# Patient Record
Sex: MALE | Race: BLACK OR AFRICAN AMERICAN | Employment: UNEMPLOYED | URBAN - NONMETROPOLITAN AREA
[De-identification: names, ages, dates, MRNs, and addresses within clinical notes are randomized per-mention and may not be internally consistent; named-entity substitution may affect disease eponyms.]

---

## 2020-01-20 ENCOUNTER — ANESTHESIA EVENT (OUTPATIENT)
Dept: OPERATING ROOM | Age: 45
End: 2020-01-20

## 2020-01-20 ENCOUNTER — ANESTHESIA (OUTPATIENT)
Dept: OPERATING ROOM | Age: 45
End: 2020-01-20

## 2020-01-20 ENCOUNTER — HOSPITAL ENCOUNTER (EMERGENCY)
Age: 45
End: 2020-01-20
Attending: EMERGENCY MEDICINE

## 2020-01-20 VITALS
SYSTOLIC BLOOD PRESSURE: 151 MMHG | DIASTOLIC BLOOD PRESSURE: 134 MMHG | TEMPERATURE: 93.1 F | WEIGHT: 180 LBS | OXYGEN SATURATION: 83 % | HEART RATE: 92 BPM

## 2020-01-20 LAB
ABO CHECK: NORMAL
ALBUMIN SERPL-MCNC: 1.7 G/DL (ref 3.5–5.1)
ALLEN TEST: ABNORMAL
ALLEN TEST: ABNORMAL
ALP BLD-CCNC: 29 U/L (ref 38–126)
ALT SERPL-CCNC: 79 U/L (ref 11–66)
ANGLE TEG: 32.4 DEG (ref 53–72)
ANION GAP SERPL CALCULATED.3IONS-SCNC: 29 MEQ/L (ref 8–16)
ANISOCYTOSIS: PRESENT
ANTIBODY SCREEN: NORMAL
APTT: 82.5 SECONDS (ref 22–38)
AST SERPL-CCNC: 88 U/L (ref 5–40)
BASE EXCESS (CALCULATED): -22.3 MMOL/L (ref -2.5–2.5)
BASE EXCESS (CALCULATED): -6.8 MMOL/L (ref -2.5–2.5)
BASOPHILS # BLD: 0.3 %
BASOPHILS ABSOLUTE: 0 THOU/MM3 (ref 0–0.1)
BILIRUB SERPL-MCNC: < 0.2 MG/DL (ref 0.3–1.2)
BILIRUBIN DIRECT: < 0.2 MG/DL (ref 0–0.3)
BUN BLDV-MCNC: 7 MG/DL (ref 7–22)
CALCIUM SERPL-MCNC: 13.9 MG/DL (ref 8.5–10.5)
CHLORIDE BLD-SCNC: 110 MEQ/L (ref 98–111)
CO2: 13 MEQ/L (ref 23–33)
COLLECTED BY:: ABNORMAL
COLLECTED BY:: ABNORMAL
CREAT SERPL-MCNC: 1 MG/DL (ref 0.4–1.2)
CRENATED RBC'S: ABNORMAL
DEVICE: ABNORMAL
DEVICE: ABNORMAL
EOSINOPHIL # BLD: 0.7 %
EOSINOPHILS ABSOLUTE: 0 THOU/MM3 (ref 0–0.4)
EPL-TEG: 94.4 %
ERYTHROCYTE [DISTWIDTH] IN BLOOD BY AUTOMATED COUNT: 14 % (ref 11.5–14.5)
ERYTHROCYTE [DISTWIDTH] IN BLOOD BY AUTOMATED COUNT: 52.2 FL (ref 35–45)
ETHYL ALCOHOL, SERUM: 0.12 %
GFR SERPL CREATININE-BSD FRML MDRD: > 90 ML/MIN/1.73M2
GLUCOSE BLD-MCNC: 326 MG/DL (ref 70–108)
HCO3: 11 MMOL/L (ref 23–28)
HCO3: 24 MMOL/L (ref 23–28)
HCT VFR BLD CALC: 27 % (ref 42–52)
HEMOGLOBIN: 7.8 GM/DL (ref 14–18)
HEPARIN THERAPY: ABNORMAL
HYPOCHROMIA: PRESENT
IFIO2: 100
IFIO2: 100
IMMATURE GRANS (ABS): 0.16 THOU/MM3 (ref 0–0.07)
IMMATURE GRANULOCYTES: 5.3 %
INHIBITION AA TEG: ABNORMAL %
INHIBITION ADP TEG: ABNORMAL %
INR BLD: 1.82 (ref 0.85–1.13)
KINETICS TEG: ABNORMAL MINUTES (ref 1–3)
LACTIC ACID: 19.5 MMOL/L (ref 0.5–2.2)
LIPASE: 13.8 U/L (ref 5.6–51.3)
LY30 (LYSIS) TEG: 94.4 % (ref 0–7.5)
LYMPHOCYTES # BLD: 70 %
LYMPHOCYTES ABSOLUTE: 2.1 THOU/MM3 (ref 1–4.8)
MA (MAX CLOT) TEG: 8.8 MM (ref 50–70)
MA(AA) TEG: 3.1 MM
MA(ACTIVATED) TEG: ABNORMAL MM
MA(ADP) TEG: 2.7 MM
MCH RBC QN AUTO: 29.3 PG (ref 26–33)
MCHC RBC AUTO-ENTMCNC: 28.9 GM/DL (ref 32.2–35.5)
MCV RBC AUTO: 101.5 FL (ref 80–94)
MONOCYTES # BLD: 4 %
MONOCYTES ABSOLUTE: 0.1 THOU/MM3 (ref 0.4–1.3)
NUCLEATED RED BLOOD CELLS: 1 /100 WBC
O2 SATURATION: 49 %
O2 SATURATION: 82 %
OSMOLALITY CALCULATION: 312.3 MOSMOL/KG (ref 275–300)
PCO2: 72 MMHG (ref 35–45)
PCO2: 95 MMHG (ref 35–45)
PH BLOOD GAS: 6.77 (ref 7.35–7.45)
PH BLOOD GAS: 7.01 (ref 7.35–7.45)
PLATELET # BLD: 34 THOU/MM3 (ref 130–400)
PLATELET ESTIMATE: ABNORMAL
PMV BLD AUTO: 10.7 FL (ref 9.4–12.4)
PO2: 51 MMHG (ref 71–104)
PO2: 71 MMHG (ref 71–104)
POIKILOCYTES: ABNORMAL
POTASSIUM SERPL-SCNC: 4.5 MEQ/L (ref 3.5–5.2)
PRO-BNP: 23.7 PG/ML (ref 0–450)
RBC # BLD: 2.66 MILL/MM3 (ref 4.7–6.1)
REACTION TIME TEG: 4.4 MINUTES (ref 5–10)
RH FACTOR: NORMAL
SCAN OF BLOOD SMEAR: NORMAL
SEG NEUTROPHILS: 19.7 %
SEGMENTED NEUTROPHILS ABSOLUTE COUNT: 0.6 THOU/MM3 (ref 1.8–7.7)
SODIUM BLD-SCNC: 152 MEQ/L (ref 135–145)
SOURCE, BLOOD GAS: ABNORMAL
SOURCE, BLOOD GAS: ABNORMAL
TOTAL PROTEIN: 2.9 G/DL (ref 6.1–8)
TROPONIN T: 0.21 NG/ML
WBC # BLD: 3 THOU/MM3 (ref 4.8–10.8)

## 2020-01-20 PROCEDURE — 2580000003 HC RX 258: Performed by: EMERGENCY MEDICINE

## 2020-01-20 PROCEDURE — 96374 THER/PROPH/DIAG INJ IV PUSH: CPT

## 2020-01-20 PROCEDURE — 6360000002 HC RX W HCPCS

## 2020-01-20 PROCEDURE — P9035 PLATELET PHERES LEUKOREDUCED: HCPCS

## 2020-01-20 PROCEDURE — 84484 ASSAY OF TROPONIN QUANT: CPT

## 2020-01-20 PROCEDURE — 2500000003 HC RX 250 WO HCPCS: Performed by: EMERGENCY MEDICINE

## 2020-01-20 PROCEDURE — 2500000003 HC RX 250 WO HCPCS

## 2020-01-20 PROCEDURE — 37799 UNLISTED PX VASCULAR SURGERY: CPT

## 2020-01-20 PROCEDURE — 83605 ASSAY OF LACTIC ACID: CPT

## 2020-01-20 PROCEDURE — 36430 TRANSFUSION BLD/BLD COMPNT: CPT

## 2020-01-20 PROCEDURE — 86901 BLOOD TYPING SEROLOGIC RH(D): CPT

## 2020-01-20 PROCEDURE — 85576 BLOOD PLATELET AGGREGATION: CPT

## 2020-01-20 PROCEDURE — 92950 HEART/LUNG RESUSCITATION CPR: CPT

## 2020-01-20 PROCEDURE — P9016 RBC LEUKOCYTES REDUCED: HCPCS

## 2020-01-20 PROCEDURE — 83690 ASSAY OF LIPASE: CPT

## 2020-01-20 PROCEDURE — 6360000002 HC RX W HCPCS: Performed by: EMERGENCY MEDICINE

## 2020-01-20 PROCEDURE — 83880 ASSAY OF NATRIURETIC PEPTIDE: CPT

## 2020-01-20 PROCEDURE — 36415 COLL VENOUS BLD VENIPUNCTURE: CPT

## 2020-01-20 PROCEDURE — 32551 INSERTION OF CHEST TUBE: CPT

## 2020-01-20 PROCEDURE — 32160 OPEN CHEST HEART MASSAGE: CPT

## 2020-01-20 PROCEDURE — 2709999900 HC NON-CHARGEABLE SUPPLY

## 2020-01-20 PROCEDURE — 3600000004 HC SURGERY LEVEL 4 BASE: Performed by: SURGERY

## 2020-01-20 PROCEDURE — 86900 BLOOD TYPING SEROLOGIC ABO: CPT

## 2020-01-20 PROCEDURE — C1729 CATH, DRAINAGE: HCPCS

## 2020-01-20 PROCEDURE — 31500 INSERT EMERGENCY AIRWAY: CPT

## 2020-01-20 PROCEDURE — 99285 EMERGENCY DEPT VISIT HI MDM: CPT

## 2020-01-20 PROCEDURE — 6820000003 HC L2 TRAUMA ALERT ACTIVATION

## 2020-01-20 PROCEDURE — 96375 TX/PRO/DX INJ NEW DRUG ADDON: CPT

## 2020-01-20 PROCEDURE — 86850 RBC ANTIBODY SCREEN: CPT

## 2020-01-20 PROCEDURE — 2720000010 HC SURG SUPPLY STERILE: Performed by: SURGERY

## 2020-01-20 PROCEDURE — 36556 INSERT NON-TUNNEL CV CATH: CPT

## 2020-01-20 PROCEDURE — 2580000003 HC RX 258

## 2020-01-20 PROCEDURE — G0480 DRUG TEST DEF 1-7 CLASSES: HCPCS

## 2020-01-20 PROCEDURE — 36600 WITHDRAWAL OF ARTERIAL BLOOD: CPT

## 2020-01-20 PROCEDURE — 85610 PROTHROMBIN TIME: CPT

## 2020-01-20 PROCEDURE — P9017 PLASMA 1 DONOR FRZ W/IN 8 HR: HCPCS

## 2020-01-20 PROCEDURE — 82803 BLOOD GASES ANY COMBINATION: CPT

## 2020-01-20 PROCEDURE — 99291 CRITICAL CARE FIRST HOUR: CPT | Performed by: SURGERY

## 2020-01-20 PROCEDURE — 3600000014 HC SURGERY LEVEL 4 ADDTL 15MIN: Performed by: SURGERY

## 2020-01-20 PROCEDURE — 82248 BILIRUBIN DIRECT: CPT

## 2020-01-20 PROCEDURE — 85025 COMPLETE CBC W/AUTO DIFF WBC: CPT

## 2020-01-20 PROCEDURE — 80053 COMPREHEN METABOLIC PANEL: CPT

## 2020-01-20 PROCEDURE — 85730 THROMBOPLASTIN TIME PARTIAL: CPT

## 2020-01-20 RX ORDER — CALCIUM CHLORIDE 100 MG/ML
INJECTION INTRAVENOUS; INTRAVENTRICULAR
Status: DISCONTINUED
Start: 2020-01-20 | End: 2020-01-20 | Stop reason: HOSPADM

## 2020-01-20 RX ORDER — CALCIUM CHLORIDE 100 MG/ML
INJECTION INTRAVENOUS; INTRAVENTRICULAR DAILY PRN
Status: COMPLETED | OUTPATIENT
Start: 2020-01-20 | End: 2020-01-20

## 2020-01-20 RX ADMIN — Medication 50 MEQ: at 04:06

## 2020-01-20 RX ADMIN — EPINEPHRINE 1 MG: 0.1 INJECTION, SOLUTION ENDOTRACHEAL; INTRACARDIAC; INTRAVENOUS at 03:32

## 2020-01-20 RX ADMIN — Medication 50 MEQ: at 03:38

## 2020-01-20 RX ADMIN — EPINEPHRINE 1 MG: 0.1 INJECTION, SOLUTION ENDOTRACHEAL; INTRACARDIAC; INTRAVENOUS at 03:04

## 2020-01-20 RX ADMIN — EPINEPHRINE 1 MG: 0.1 INJECTION, SOLUTION ENDOTRACHEAL; INTRACARDIAC; INTRAVENOUS at 03:20

## 2020-01-20 RX ADMIN — EPINEPHRINE 1 MG: 0.1 INJECTION, SOLUTION ENDOTRACHEAL; INTRACARDIAC; INTRAVENOUS at 03:29

## 2020-01-20 RX ADMIN — CALCIUM CHLORIDE 1 G: 100 INJECTION, SOLUTION INTRAVENOUS at 03:51

## 2020-01-20 RX ADMIN — EPINEPHRINE 5 MCG/MIN: 1 INJECTION, SOLUTION INTRAMUSCULAR; INTRAVENOUS; SUBCUTANEOUS at 03:46

## 2020-01-20 RX ADMIN — EPINEPHRINE 1 MG: 0.1 INJECTION, SOLUTION ENDOTRACHEAL; INTRACARDIAC; INTRAVENOUS at 03:10

## 2020-01-20 RX ADMIN — Medication 50 MEQ: at 04:04

## 2020-01-20 RX ADMIN — Medication 50 MEQ: at 03:56

## 2020-01-20 RX ADMIN — EPINEPHRINE 1 MG: 0.1 INJECTION, SOLUTION ENDOTRACHEAL; INTRACARDIAC; INTRAVENOUS at 03:23

## 2020-01-20 RX ADMIN — EPINEPHRINE 1 MG: 0.1 INJECTION, SOLUTION ENDOTRACHEAL; INTRACARDIAC; INTRAVENOUS at 03:34

## 2020-01-20 RX ADMIN — EPINEPHRINE 1 MG: 0.1 INJECTION, SOLUTION ENDOTRACHEAL; INTRACARDIAC; INTRAVENOUS at 03:07

## 2020-01-20 RX ADMIN — EPINEPHRINE 1 MG: 0.1 INJECTION, SOLUTION ENDOTRACHEAL; INTRACARDIAC; INTRAVENOUS at 03:01

## 2020-01-20 RX ADMIN — CALCIUM CHLORIDE 1 G: 100 INJECTION, SOLUTION INTRAVENOUS at 03:52

## 2020-01-20 RX ADMIN — Medication 50 MEQ: at 03:57

## 2020-01-20 RX ADMIN — Medication 50 MEQ: at 03:55

## 2020-01-20 RX ADMIN — CALCIUM CHLORIDE 1 G: 100 INJECTION, SOLUTION INTRAVENOUS at 03:50

## 2020-01-20 RX ADMIN — EPINEPHRINE 1 MG: 0.1 INJECTION, SOLUTION ENDOTRACHEAL; INTRACARDIAC; INTRAVENOUS at 03:13

## 2020-01-20 RX ADMIN — EPINEPHRINE 1 MG: 0.1 INJECTION, SOLUTION ENDOTRACHEAL; INTRACARDIAC; INTRAVENOUS at 02:58

## 2020-01-20 RX ADMIN — Medication 50 MEQ: at 03:34

## 2020-01-20 RX ADMIN — Medication 50 MEQ: at 04:05

## 2020-01-20 RX ADMIN — EPINEPHRINE 1 MG: 0.1 INJECTION, SOLUTION ENDOTRACHEAL; INTRACARDIAC; INTRAVENOUS at 03:17

## 2020-01-20 RX ADMIN — EPINEPHRINE 1 MG: 0.1 INJECTION, SOLUTION ENDOTRACHEAL; INTRACARDIAC; INTRAVENOUS at 03:26
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
## 2020-01-20 NOTE — ED NOTES
Fifth unit of packed RBCs started at this time     Toya Hernández RN  01/20/20 280 [address] Zion Lemos RN  01/22/20 0216
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
## 2020-01-20 NOTE — PROGRESS NOTES
Patient intubated by this RRT, size 7.5 ETT secured with tube renae. Positive color change and bilateral breath sounds noted.

## 2020-01-20 NOTE — PROGRESS NOTES
[address] PT TO OR 3 ACCOMPANIED BY ER STAFF ROHAN WHITING RN, Rosette Delgadillo EMT, Ashely Farooq RN. PT MOVED TO OR BED. MONITORS PLACED ON PT. ANESTHESIA BEGAN TO MONITOR.  0426 NO PULSE NOTED. CHEST COMPRESSIONS BEGAN, 1 MG EPI GIVEN.  0429 CHEST COMPRESSIONS CONTINUE. CALCIUM 1GM GIVEN, EPI 1MG GIVEN.  0431 PULSE CHECK. NO PULSE. PT BLEEDING PROFUSELY FROM CHEST INCISION SITE.  0432 EPI 1MG GIVEN  0433 DR. MENDEZ CALLED CODE EFFORTS COMPLETE. PT .

## 2020-01-20 NOTE — ED PROVIDER NOTES
Intubation  Date/Time: 1/20/2020 5:12 AM  Performed by: Pearle Nageotte, APRN - CNP  Authorized by: Faye Grullon MD     Consent:     Consent obtained:  Emergent situation  Pre-procedure details:     Patient status:  Unresponsive    Mallampati score:  II    Pretreatment medications:  None    Paralytics:  None  Procedure details:     Preoxygenation:  Bag valve mask    CPR in progress: yes      Intubation method:  Oral    Oral intubation technique:  Direct    Laryngoscope blade: Mac 3    Tube size (mm):  7.5    Tube type:  Cuffed    Number of attempts:  2    Ventilation between attempts: yes      Cricoid pressure: no      Tube visualized through cords: yes    Placement assessment:     Tube secured with:  ETT renae    Breath sounds:  Equal and absent over the epigastrium    Placement verification: chest rise, direct visualization, equal breath sounds and ETCO2 detector      CXR findings:  ETT in proper place  Post-procedure details:     Patient tolerance of procedure:   Tolerated well, no immediate complications  Comments:      Assisted by YUE Montana CNP  01/20/20 1272
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
## 2020-01-20 NOTE — ED NOTES
OG placed at this time by Valdo Mcgowan. Tube secured at 60cm at the lips on the left side of the mouth.       John Oconnell RN  01/20/20 7916

## (undated) DEVICE — SEALER TISS L20CM DIA13MM ADV BPLR L CRV JAW OPN APPRCH